# Patient Record
Sex: FEMALE | Race: WHITE | NOT HISPANIC OR LATINO | Employment: PART TIME | URBAN - METROPOLITAN AREA
[De-identification: names, ages, dates, MRNs, and addresses within clinical notes are randomized per-mention and may not be internally consistent; named-entity substitution may affect disease eponyms.]

---

## 2017-06-21 DIAGNOSIS — Z12.31 ENCOUNTER FOR SCREENING MAMMOGRAM FOR MALIGNANT NEOPLASM OF BREAST: ICD-10-CM

## 2017-06-21 DIAGNOSIS — R92.2 INCONCLUSIVE MAMMOGRAM: ICD-10-CM

## 2018-01-09 NOTE — MISCELLANEOUS
Message  Return to work or school evisit:   Genaro Giraldo is under my professional care   She was evaluated by myself via video conference on 9/29/16             Future Appointments    Signatures   Electronically signed by : PATRICK Roberts ; Sep 29 2016  9:53AM EST                       (Author)

## 2018-01-10 NOTE — PROGRESS NOTES
Assessment    1  Bilateral conjunctivitis (372 30) (H10 9)    Plan  Bilateral conjunctivitis    · Ofloxacin 0 3 % Ophthalmic Solution; INSTILL 1 DROP INTO AFFECTED EYE(S) 4  TIMES DAILY    Discussion/Summary    Throw away any eye make up you have used in the past few days  The treatment plan was reviewed with the patient/guardian  The patient/guardian understands and agrees with the treatment plan     Follow Up with your Primary Care Provider in 4-5 days  If your symptoms worsen follow up at the nearest [place] [address] Emergency Room  Chief Complaint    1  Eye Discharge    History of Present Illness  Pt does not wear contacts  Woke with eyes crusted shut and with continuous drainage since  Poonam Hansen presents with complaints of sudden onset of constant episodes of moderate bilateral eye discharge, described as copious  Episodes started about 12 hours ago  Associated symptoms include eye redness, eye itching, eye irritation and eye burning  Review of Systems    Constitutional: No fever, no chills, feels well, no tiredness, no recent weight gain or loss  ENT: as noted in HPI  Cardiovascular: no complaints of slow or fast heart rate, no chest pain, no palpitations, no leg claudication or lower extremity edema  Respiratory: no complaints of shortness of breath, no wheezing, no dyspnea on exertion, no orthopnea or PND  Active Problems    1  Allergic Reaction (995 3)   2  Asthma, mild persistent (493 90) (J45 30)   3  Encounter for screening mammogram for malignant neoplasm of breast (V76 12)   (Z12 31)   4  Lumbago (724 2) (M54 5)   5  Lymphadenopathy (785 6) (R59 1)   6  Screening for cardiovascular condition (V81 2) (Z13 6)   7  Screening for diabetes mellitus (V77 1) (Z13 1)   8  Screening for hyperlipidemia (V77 91) (Z13 220)   9  Well adult on routine health check (V70 0) (Z00 00)    Past Medical History    1  History of Acute bronchitis (466 0) (J20 9)   2   History of Acute pharyngitis (462) (J02 9)   3  History of Acute sinusitis (461 9) (J01 90)   4  History of Asthma (493 90) (J45 909)   5  Asthma with exacerbation (493 92) (J45 901)   6  Candidiasis of vagina (112 1) (B37 3)   7  History of Contusion With Intact Skin Surface Of An Unspecified Part Of The Lower Limb   (924 5)   8  History of Dysfunction of eustachian tube, unspecified laterality (381 81) (H69 80)   9  History of Generalized muscle ache (729 1) (M79 1)   10  History of arthritis (V13 4) (Z87 39)   11  History of Ovarian cyst, right (620 2) (N83 20)   12  History of Right lower quadrant abdominal pain (789 03) (R10 31)   13  History of Streptococcal sore throat (034 0) (J02 0)   14  History of Tinea versicolor (111 0) (B36 0)   15  History of Urinary frequency (788 41) (R35 0)    The active problems and past medical history were reviewed and updated today  Family History  Family History    1  Family history of Diabetes Mellitus (V18 0)   2  Family history of cardiac disorder (V17 49) (Z82 49)   3  Family history of skin conditions (V19 4) (Z84 0)    The family history was reviewed and updated today  Social History    · Alcohol use (V49 89) (Z78 9)   · Coffee   · Exercise frequency (times/week)   ·    · Never a smoker   · Sexually active  The social history was reviewed and updated today  Surgical History    1  History of  Section   2  History of Cholecystectomy   3  History of Hysterectomy    The surgical history was reviewed and updated today  Current Meds   1  Advair Diskus 250-50 MCG/DOSE Inhalation Aerosol Powder Breath Activated; INHALE 1   PUFF TWICE DAILY; Therapy: 06TOO5765 to (Evaluate:2017)  Requested for: 86UVQ0904; Last   Rx:2016 Ordered   2  Albuterol 90 MCG/ACT AERS; INHALE 1 TO 2 PUFFS EVERY 4 TO 6 HOURS AS   NEEDED Recorded   3  Fluconazole 150 MG Oral Tablet; 1 po daily times one day, then repeat in 3 days if   needed;    Therapy: 49AOH5347 to (Last Rx: [id number])  Requested for: 75XJO2717 Ordered   4  Levalbuterol HCl - 0 63 MG/3ML Inhalation Nebulization Solution (Xopenex); USE 1 UNIT   DOSE IN NEBULIZER EVERY 6 TO 8 HOURS AS NEEDED; Therapy: 90QYK4505 to (Last Rx:16Xtc1428)  Requested for: 40CVW4772 Ordered   5  Montelukast Sodium 10 MG Oral Tablet; TAKE 1 TABLET Bedtime; Therapy: 85WMD2450 to (Evaluate:04Jun2017)  Requested for: 47TXP6153; Last   Rx:09Jun2016 Ordered    The medication list was reviewed and updated today  Allergies    1  No Known Drug Allergies    Observations Made  BL eye redness  No other URI symptoms      Message    Rach Mckeon is under my professional care   She was evaluated by myself via video conference on 9/29/16             Future Appointments    Date/Time Provider Specialty Site   12/13/2016 01:15 PM Hans Kearney, [address] [place] [address]     Signatures   Electronically signed by : PATRICK Ozuna ; Sep 29 2016  9:53AM EST                       (Author)

## 2018-06-26 ENCOUNTER — TRANSCRIBE ORDERS (OUTPATIENT)
Dept: ADMINISTRATIVE | Facility: HOSPITAL | Age: 48
End: 2018-06-26

## 2018-06-26 DIAGNOSIS — R10.2 PELVIC PAIN: Primary | ICD-10-CM

## 2018-06-28 ENCOUNTER — HOSPITAL ENCOUNTER (OUTPATIENT)
Dept: RADIOLOGY | Facility: HOSPITAL | Age: 48
Discharge: HOME | End: 2018-06-28
Payer: COMMERCIAL

## 2018-06-28 DIAGNOSIS — R10.2 PELVIC PAIN: ICD-10-CM

## 2018-06-28 PROCEDURE — 76856 US EXAM PELVIC COMPLETE: CPT

## 2018-06-28 PROCEDURE — 76830 TRANSVAGINAL US NON-OB: CPT

## 2018-07-13 ENCOUNTER — OFFICE VISIT (OUTPATIENT)
Dept: OBGYN CLINIC | Facility: CLINIC | Age: 48
End: 2018-07-13
Payer: COMMERCIAL

## 2018-07-13 VITALS — SYSTOLIC BLOOD PRESSURE: 118 MMHG | BODY MASS INDEX: 22.37 KG/M2 | DIASTOLIC BLOOD PRESSURE: 76 MMHG | WEIGHT: 138.6 LBS

## 2018-07-13 DIAGNOSIS — N83.202 CYST OF LEFT OVARY: Primary | ICD-10-CM

## 2018-07-13 DIAGNOSIS — R23.2 HOT FLASHES: ICD-10-CM

## 2018-07-13 DIAGNOSIS — R92.2 DENSE BREAST TISSUE ON MAMMOGRAM: ICD-10-CM

## 2018-07-13 PROBLEM — R92.30 DENSE BREAST TISSUE ON MAMMOGRAM: Status: ACTIVE | Noted: 2017-06-21

## 2018-07-13 PROBLEM — K21.9 GERD (GASTROESOPHAGEAL REFLUX DISEASE): Status: ACTIVE | Noted: 2017-04-04

## 2018-07-13 PROBLEM — I34.1 MVP (MITRAL VALVE PROLAPSE): Status: ACTIVE | Noted: 2017-04-04

## 2018-07-13 PROCEDURE — 99203 OFFICE O/P NEW LOW 30 MIN: CPT | Performed by: OBSTETRICS & GYNECOLOGY

## 2018-07-13 RX ORDER — PANTOPRAZOLE SODIUM 40 MG/1
TABLET, DELAYED RELEASE ORAL
COMMUNITY
Start: 2017-03-02

## 2018-07-13 RX ORDER — CEPHALEXIN 500 MG/1
CAPSULE ORAL
Refills: 0 | COMMUNITY
Start: 2018-07-11 | End: 2018-11-02

## 2018-07-13 NOTE — PROGRESS NOTES
Assessment/Plan: 53year old with resolved pain, and ultrasound showing a left ovarian cyst  1) ovarian cyst- repeat ultrasound in 8 weeks  2) hot flashes- FSH ordered to see if patient is menopausal given history of hysterectomy  3) Dense breast tissue on mammogram- screening mammo ordered  -Return in 8 weeks for annual exam      Subjective Claudean Cheadle is a 53 y o  female here for a problem visit  Patient is here to discuss recent ultrasound results  Sx's started 2 weeks ago  Patient felt extremely bloated and had a lot of pressure  The pain subsided, and she had an ultrasound performed which is listed bellow  She no longer has pressure  She has a history of a supracervical hysterectomy( as per patient) 7 or 8 years ago secondary to adenomyosis  Her last pap smear was a a couple years ago  Denies history of STI's or abnormal pap smears  Has 3 children ( 2 vaginal and one c/s)  She is currently sexually active with her , and has no problems with intercourse  Has a history of of ovarian cyst    There is no problem list on file for this patient  Gynecologic History  No LMP recorded (lmp unknown)  Patient has had a hysterectomy  The following portions of the patient's history were reviewed and updated as appropriate: allergies, current medications, past family history, past medical history, past social history, past surgical history and problem list     Review of Systems  Pertinent items are noted in HPI  Objective    /76   Wt 62 9 kg (138 lb 9 6 oz)   LMP  (LMP Unknown)   Breastfeeding? No   BMI 22 37 kg/m²    GEN: The patient was alert and oriented x3, pleasant well-appearing female in no acute distress     ABD: soft, nT, positive bowel sounds  CHEST: BLCTA, no crackles, no wheezes  CV: RRR, no murmurs

## 2018-11-02 ENCOUNTER — ANNUAL EXAM (OUTPATIENT)
Dept: OBGYN CLINIC | Facility: CLINIC | Age: 48
End: 2018-11-02
Payer: COMMERCIAL

## 2018-11-02 VITALS
BODY MASS INDEX: 22.85 KG/M2 | DIASTOLIC BLOOD PRESSURE: 76 MMHG | WEIGHT: 142.2 LBS | HEIGHT: 66 IN | SYSTOLIC BLOOD PRESSURE: 124 MMHG

## 2018-11-02 DIAGNOSIS — Z01.419 WELL WOMAN EXAM WITH ROUTINE GYNECOLOGICAL EXAM: Primary | ICD-10-CM

## 2018-11-02 DIAGNOSIS — R31.9 HEMATURIA, UNSPECIFIED TYPE: ICD-10-CM

## 2018-11-02 DIAGNOSIS — N95.2 VAGINAL ATROPHY: ICD-10-CM

## 2018-11-02 PROCEDURE — 99396 PREV VISIT EST AGE 40-64: CPT | Performed by: OBSTETRICS & GYNECOLOGY

## 2018-11-02 NOTE — PROGRESS NOTES
ASSESSMENT & PLAN: Lucia Wilson is a 53 y o  [id number] with normal gynecologic exam     1   Routine well woman exam done today  2  Pap and HPV:  The patient's last pap was   Pap and cotesting was done today  Current ASCCP Guidelines reviewed  Repeat in 5 years if normal  3  Mammogram up to date  4  The following were reviewed in today's visit: breast self exam, menopause, osteoporosis, adequate intake of calcium and vitamin D, exercise and healthy diet  5  Menopause- complaining of vaginal dryness and overactive bladder  6   overactive bladder- discussed bladder diary and dietary changes  7  Supracervical hysterectomy for heavy bleeding in  by Dr Milton Brink  8  Guidelines for colonoscopy discussed  Patient will schedule appointment with GI  9  Dyspareunia- Rx for Premarin cream sent to the pharmacy  Patient advised to use it twice a week  Will follow up in 2 months for evaluation  10  Overactive bladder- will keep voiding diarrhea will come to office to the discuss further treatment options necessary  11    Hematuria- hematuria noted on urinalysis performed after exam secondary to overactive bladder  Will repeat urinalysis in 2 weeks    CC:  Annual Gynecologic Examination    HPI: Lucia Wilson is a 53 y o  [id number] who presents for annual gynecologic examination  She has the following concerns:  none    Health Maintenance:    She exercises 2 days per week with cardio  She wears her seatbelt routinely  She does perform regular monthly self breast exams  She feels safe at home  Patients does not follow a particular diet        Her last pap was 3 years ago   Last mammogram: 2018  Last Colonoscopy: never    Past Medical History:   Diagnosis Date    Adenomyosis     Asthma     Murmur        Past Surgical History:   Procedure Laterality Date    CHOLECYSTECTOMY      HYSTERECTOMY         Past OB/Gyn History:  OB History      Para Term  AB Living    3 3 3     3    SAB TAB Ectopic Multiple Live Births            3           No LMP recorded (lmp unknown)  Patient has had a hysterectomy  History of sexually transmitted infection No  History of abnormal pap smears  No     Patient is currently sexually active  heterosexual Birth control: none  Family History   Problem Relation Age of Onset    Diabetes Father        Social History:  Social History     Social History    Marital status: /Civil Union     Spouse name: N/A    Number of children: N/A    Years of education: N/A     Occupational History    Not on file  Social History Main Topics    Smoking status: Never Smoker    Smokeless tobacco: Never Used    Alcohol use No    Drug use: No    Sexual activity: Yes     Birth control/ protection: None     Other Topics Concern    Not on file     Social History Narrative    No narrative on file     Presently lives with   Patient is   Patient is currently employed chiropractic assistant    Allergies   Allergen Reactions    Other Anaphylaxis     Pecan and Soy Nuts    Pecan Nut (Diagnostic)     Pseudoephedrine Other (See Comments)     Felt out of it  Felt out of it    Soy Lecithin [Lecithin]        Current Outpatient Prescriptions:     Albuterol Sulfate 108 (90 Base) MCG/ACT AEPB, Inhale 2 puffs every 6 (six) hours, Disp: , Rfl:     montelukast (SINGULAIR) 10 mg tablet, Take 10 mg by mouth daily at bedtime, Disp: , Rfl:     pantoprazole (PROTONIX) 40 mg tablet, take 1 tablet by mouth once daily, Disp: , Rfl:     Review of Systems:  A complete review of systems was performed and was negative, except as listed  none    Physical Exam:  /76   Ht 5' 6" (1 676 m)   Wt 64 5 kg (142 lb 3 2 oz)   LMP  (LMP Unknown)   BMI 22 95 kg/m²    GEN: The patient was alert and oriented x3, pleasant well-appearing female in no acute distress     HEENT:  Unremarkable, no anterior or posterior lymphadenopathy, no thyromegaly  CV:  RRR, no murmurs  RESP:  Clear to auscultation bilaterally  BREAST:  Symmetric breasts with no palpable breast masses or obvious breast lesions  She has no retractions or nipple discharge  She has no axillary abnormalities or palpable masses  Self breast exam is taught  ABD:  Soft, nontender, nondistended, normoactive bowel sounds,   EXT:  WWP, nontender, no edema  BACK:  No CVA tenderness, no tenderness to palpation along spine  PELVIC:  Normal appearing external female genitalia, normal vaginal epithelium, No discharge  Cervix present   Bimanual: absent CMT,  absent uterus, non-tender  No palpable adnexal masses

## 2018-11-06 LAB
CYTOLOGIST CVX/VAG CYTO: NORMAL
DX ICD CODE: NORMAL
HPV I/H RISK 1 DNA CVX QL PROBE+SIG AMP: NEGATIVE
OTHER STN SPEC: NORMAL
PATH REPORT.FINAL DX SPEC: NORMAL
SL AMB NOTE:: NORMAL
SL AMB SPECIMEN ADEQUACY: NORMAL
SL AMB TEST METHODOLOGY: NORMAL

## 2020-02-10 ENCOUNTER — TELEPHONE (OUTPATIENT)
Dept: OBGYN CLINIC | Facility: CLINIC | Age: 50
End: 2020-02-10

## 2020-02-10 DIAGNOSIS — N95.2 VAGINAL ATROPHY: ICD-10-CM

## 2020-02-10 NOTE — TELEPHONE ENCOUNTER
Dr Alberto Rasmussen pt, scheduled an appt with you on 3/18  Needs a fill of premarin to get to appt   Reviewed pharmacy, please send to [facility]

## 2021-02-18 DIAGNOSIS — Z23 ENCOUNTER FOR IMMUNIZATION: ICD-10-CM

## 2022-03-01 NOTE — PROGRESS NOTES
Assessment and Plan:   Ms Gloria Hunter is a 60-year-old female with no significant past medical history who presents for evaluation of a positive MAYTE 1:80 homogeneous pattern and abnormal hand x-rays suggestive of an inflammatory arthritis  She is referred by Dr Quique Geller for a rheumatology consult  Alfred Cobb presents today for evaluation of a positive MAYTE that was detected due to progressively worsening joint pains she has been experiencing over the past few months, especially affecting her hands and wrists  Given the symmetric small joint involvement along with concerns for an inflammatory arthropathy/periarticular osteopenia on the x-rays as well as significant response noted to a trial of steroids, her presentation does concern me for an inflammatory arthritis possibly rheumatoid arthritis  To further evaluate her symptoms I would like to obtain rheumatoid serologies as well as schedule her for an ultrasound of her hands and wrists in the next few weeks to assess for subtle inflammation as her joint examination is unrevealing today (likely a result of her recent steroid use)  In the meanwhile if the pain recurs I requested she try to manage with over-the-counter Tylenol and NSAIDs as needed and I would prefer if she can refrain from a course of steroids until the ultrasound is done  - In regards to the positive MAYTE she does not report extra-articular symptoms that would be concerning for an underlying connective tissue disease  Once the workup is complete I will determine if the MAYTE may be clinically significant in the context of the inflammatory arthritis that is being evaluated  Plan:  Diagnoses and all orders for this visit:    Positive MAYTE (antinuclear antibody)  -     Beta-2 glycoprotein antibodies; Future  -     C3 complement; Future  -     C4 complement; Future  -     Cardiolipin antibody; Future  -     Lupus anticoagulant; Future  -     Cyclic citrul peptide antibody, IgG;  Future  -     HLA-B27 antigen; Future  -     Protein / creatinine ratio, urine  -     Urinalysis with microscopic  -     MAYTE Comprehensive Panel; Future  -     Rheumatoid Arthritis Factor; Future  -     Beta-2 glycoprotein antibodies  -     C3 complement  -     C4 complement  -     Cardiolipin antibody  -     Lupus anticoagulant  -     Cyclic citrul peptide antibody, IgG  -     HLA-B27 antigen  -     MAYTE Comprehensive Panel  -     Rheumatoid Arthritis Factor    Diffuse arthralgia  -     Cyclic citrul peptide antibody, IgG; Future  -     HLA-B27 antigen; Future  -     Rheumatoid Arthritis Factor; Future  -     Cyclic citrul peptide antibody, IgG  -     HLA-B27 antigen  -     Rheumatoid Arthritis Factor    Bilateral hand pain  -     US MSK limited; Future    History of recent steroid use    Need for hepatitis C screening test  -     Hepatitis B surface antigen; Future  -     Hepatitis B core antibody, total; Future  -     Hepatitis C antibody; Future  -     Hepatitis B surface antigen  -     Hepatitis B core antibody, total  -     Hepatitis C antibody    Vitamin D deficiency  -     Vitamin D 25 hydroxy; Future  -     Vitamin D 25 hydroxy    Other orders  -     ciprofloxacin (CILOXAN) 0 3 % ophthalmic solution; instill 1 drop into both eyes four times a day  -     cycloSPORINE (Restasis) 0 05 % ophthalmic emulsion; instill 1 drop into both eyes twice a day  -     Diclofenac Sodium (VOLTAREN) 1 %; Apply 1 application topically 4 (four) times a day  -     erythromycin (ILOTYCIN) ophthalmic ointment; apply into both eyes at bedtime as directed  -     magnesium oxide (MAG-OX) 400 mg tablet; Take 1 tablet by mouth daily  -     ondansetron (ZOFRAN) 4 mg tablet; take 1 tablet by mouth once daily if needed for nausea and vomiting  -     Discontinue: predniSONE 20 mg tablet;  Take 40 mg by mouth daily (Patient not taking: Reported on 3/2/2022 )  -     tobramycin-dexamethasone (TOBRADEX) ophthalmic suspension; instill 1 drop into both eyes four times a day for 7 days      Activities as tolerated  Exercise: try to maintain a low impact exercise regimen as much as possible  Walk for 30 minutes a day for at least 3 days a week  Continue other medications as prescribed by PCP and other specialists  RTC in 6 weeks  HPI  Ms Raysa Rosa is a 67-year-old female with no significant past medical history who presents for evaluation of a positive MAYTE 1:80 homogeneous pattern and abnormal hand x-rays suggestive of an inflammatory arthritis  She is referred by Dr Lynsey Paul for a rheumatology consult  Patient reports around August 2021 she started to experience bilateral hand and wrist pain which has been gradually progressive since then  Her symptoms have been occurring on a daily basis and have been fairly constant  She has had difficulty with hand related activities as well as with her  strength  She has noticed occasional pain also occur in her elbows, knees and ankles  No significant joint pains in her shoulders, low back, hips or feet  At times she has noticed her fingers to be puffy appearing  She does experience morning stiffness affecting her hands that can take up to an hour to improve  She has tried over-the-counter NSAIDs without any improvement in her symptoms  In view of these complaints she was seen by her primary care physician and had hand x-rays done which showed findings of periarticular osteopenia concerning for an inflammatory arthritis  Blood work showed the positive MAYTE  A CBC, CMP, ESR, CRP and TSH were unremarkable  She denies fevers, unintentional weight loss, focal alopecia, inflammatory eye disease (does report dry eyes for which she is on Restasis), dry mouth, skin rash, psoriasis, photosensitivity, mouth/ulcers, swollen glands, pleuritic chest pain, inflammatory bowel disease, blood clots, miscarriages, Raynaud's or a family history of autoimmune disease      In view of her ongoing joint pains she was prescribed a prednisone course of 40 mg once daily for 5 days that she completed less than a week ago  She states while on the prednisone this helped significantly to resolve all her symptoms  Since completing the prednisone course she has felt the hand pain to come back to some degree but does not compare to the prominent pain she was experiencing previously  The following portions of the patient's history were reviewed and updated as appropriate: allergies, current medications, past family history, past medical history, past social history, past surgical history and problem list       Review of Systems  Constitutional: Negative for weight change, fevers, chills, night sweats, fatigue  ENT/Mouth: Negative for hearing changes, ear pain, nasal congestion, sinus pain, hoarseness, sore throat, rhinorrhea, swallowing difficulty  Eyes: Negative for pain, redness, discharge, vision changes  Cardiovascular: Negative for chest pain, SOB, palpitations  Respiratory: Negative for cough, sputum, wheezing, dyspnea  Gastrointestinal: Negative for nausea, vomiting, diarrhea, constipation, pain, heartburn  Genitourinary: Negative for dysuria, urinary frequency, hematuria  Musculoskeletal: As per HPI  Skin: Negative for skin rash, color changes  Neuro: Negative for weakness, numbness, tingling, loss of consciousness  Psych: Negative for anxiety, depression  Heme/Lymph: Negative for easy bruising, bleeding, lymphadenopathy          Past Medical History:   Diagnosis Date    Adenomyosis     Arthritis     Asthma     Asthma with exacerbation     Resolved 2015     Murmur     Ovarian cyst, right     Resolved 2016     Tinea versicolor     Resolved 2014     Urinary frequency     Resolved 2014        Past Surgical History:   Procedure Laterality Date     SECTION      CHOLECYSTECTOMY  1998    HYSTERECTOMY         Social History     Socioeconomic History    Marital status: /Civil Union     Spouse name: Not on file    Number of children: Not on file    Years of education: Not on file    Highest education level: Not on file   Occupational History    Not on file   Tobacco Use    Smoking status: Never Smoker    Smokeless tobacco: Never Used   Substance and Sexual Activity    Alcohol use: Yes     Comment: socially    Drug use: No    Sexual activity: Yes     Birth control/protection: None   Other Topics Concern    Not on file   Social History Narrative    Coffee    Exercise frequency (times/week)     Social Determinants of Health     Financial Resource Strain: Not on file   Food Insecurity: Not on file   Transportation Needs: Not on file   Physical Activity: Not on file   Stress: Not on file   Social Connections: Not on file   Intimate Partner Violence: Not on file   Housing Stability: Not on file       Family History   Problem Relation Age of Onset    Diabetes Father     Diabetes Family     Heart disease Family     Other Family         Skin conditions        Allergies   Allergen Reactions    Other Anaphylaxis     Pecan and Soy Nuts    Pecan Nut (Diagnostic) - Food Allergy     Pseudoephedrine Other (See Comments)     Felt out of it  Felt out of it    Soy Lecithin [Lecithin]        Current Outpatient Medications:     cycloSPORINE (Restasis) 0 05 % ophthalmic emulsion, instill 1 drop into both eyes twice a day, Disp: , Rfl:     Diclofenac Sodium (VOLTAREN) 1 %, Apply 1 application topically 4 (four) times a day, Disp: , Rfl:     montelukast (SINGULAIR) 10 mg tablet, Take 10 mg by mouth daily at bedtime, Disp: , Rfl:     Albuterol Sulfate 108 (90 Base) MCG/ACT AEPB, Inhale 2 puffs every 6 (six) hours, Disp: , Rfl:     ciprofloxacin (CILOXAN) 0 3 % ophthalmic solution, instill 1 drop into both eyes four times a day, Disp: , Rfl:     conjugated estrogens (PREMARIN) vaginal cream, Insert 0 5 g into the vagina 2 (two) times a week, Disp: 42 5 g, Rfl: 1    erythromycin (ILOTYCIN) ophthalmic ointment, apply into both eyes at bedtime as directed, Disp: , Rfl:     magnesium oxide (MAG-OX) 400 mg tablet, Take 1 tablet by mouth daily, Disp: , Rfl:     ondansetron (ZOFRAN) 4 mg tablet, take 1 tablet by mouth once daily if needed for nausea and vomiting, Disp: , Rfl:     pantoprazole (PROTONIX) 40 mg tablet, take 1 tablet by mouth once daily, Disp: , Rfl:     tobramycin-dexamethasone (TOBRADEX) ophthalmic suspension, instill 1 drop into both eyes four times a day for 7 days, Disp: , Rfl:       Objective:    Vitals:    03/02/22 1139   BP: 108/78   Pulse: 80   Weight: 65 3 kg (144 lb)       Physical Exam  General: Well appearing, well nourished, in no distress  Oriented x 3, normal mood and affect  Ambulating without difficulty  Skin: Good turgor, no rash, unusual bruising or prominent lesions  Hair: Normal texture and distribution  Nails: Normal color, no deformities  HEENT:  Head: Normocephalic, atraumatic  Eyes: Conjunctiva clear, sclera non-icteric, EOM intact  Extremities: No amputations or deformities, cyanosis, edema  Musculoskeletal:   Hands - the right hand joints are unremarkable  At the left hand she is tender to palpation of the 2nd through 5th PIP joints without soft tissue swelling  The remaining peripheral joints are without soft tissue swelling or tenderness  Neurologic: Alert and oriented  No focal neurological deficits appreciated  Psychiatric: Normal mood and affect  DEANDRE Celaya    Rheumatology

## 2022-03-02 ENCOUNTER — OFFICE VISIT (OUTPATIENT)
Dept: RHEUMATOLOGY | Facility: CLINIC | Age: 52
End: 2022-03-02
Payer: COMMERCIAL

## 2022-03-02 VITALS
WEIGHT: 144 LBS | HEART RATE: 80 BPM | DIASTOLIC BLOOD PRESSURE: 78 MMHG | SYSTOLIC BLOOD PRESSURE: 108 MMHG | BODY MASS INDEX: 23.24 KG/M2

## 2022-03-02 DIAGNOSIS — M79.642 BILATERAL HAND PAIN: ICD-10-CM

## 2022-03-02 DIAGNOSIS — M79.641 BILATERAL HAND PAIN: ICD-10-CM

## 2022-03-02 DIAGNOSIS — Z11.59 NEED FOR HEPATITIS C SCREENING TEST: ICD-10-CM

## 2022-03-02 DIAGNOSIS — E55.9 VITAMIN D DEFICIENCY: ICD-10-CM

## 2022-03-02 DIAGNOSIS — M25.50 DIFFUSE ARTHRALGIA: ICD-10-CM

## 2022-03-02 DIAGNOSIS — Z92.241 HISTORY OF RECENT STEROID USE: ICD-10-CM

## 2022-03-02 DIAGNOSIS — R76.8 POSITIVE ANA (ANTINUCLEAR ANTIBODY): Primary | ICD-10-CM

## 2022-03-02 PROCEDURE — 99205 OFFICE O/P NEW HI 60 MIN: CPT | Performed by: INTERNAL MEDICINE

## 2022-03-02 RX ORDER — CYCLOSPORINE 0.5 MG/ML
EMULSION OPHTHALMIC
COMMUNITY
Start: 2021-11-23

## 2022-03-02 RX ORDER — ERYTHROMYCIN 5 MG/G
OINTMENT OPHTHALMIC
COMMUNITY
Start: 2021-12-09 | End: 2022-04-13

## 2022-03-02 RX ORDER — CIPROFLOXACIN HYDROCHLORIDE 3.5 MG/ML
SOLUTION/ DROPS TOPICAL
COMMUNITY
Start: 2021-12-09 | End: 2022-04-13

## 2022-03-02 RX ORDER — PREDNISONE 20 MG/1
40 TABLET ORAL DAILY
COMMUNITY
Start: 2022-02-15 | End: 2022-03-02

## 2022-03-02 RX ORDER — ONDANSETRON 4 MG/1
TABLET, FILM COATED ORAL
COMMUNITY
Start: 2021-11-26 | End: 2022-04-13

## 2022-03-02 RX ORDER — TOBRAMYCIN AND DEXAMETHASONE 3; 1 MG/ML; MG/ML
SUSPENSION/ DROPS OPHTHALMIC
COMMUNITY
Start: 2021-11-23 | End: 2022-04-13

## 2022-03-02 RX ORDER — MAGNESIUM OXIDE 400 MG/1
1 TABLET ORAL DAILY
COMMUNITY
Start: 2021-11-26 | End: 2022-04-13

## 2022-03-29 ENCOUNTER — HOSPITAL ENCOUNTER (OUTPATIENT)
Dept: RADIOLOGY | Facility: HOSPITAL | Age: 52
Discharge: HOME | End: 2022-03-29
Attending: INTERNAL MEDICINE
Payer: COMMERCIAL

## 2022-03-29 DIAGNOSIS — M79.642 BILATERAL HAND PAIN: ICD-10-CM

## 2022-03-29 DIAGNOSIS — M79.641 BILATERAL HAND PAIN: ICD-10-CM

## 2022-03-29 PROCEDURE — 76882 US LMTD JT/FCL EVL NVASC XTR: CPT

## 2022-04-04 ENCOUNTER — TELEPHONE (OUTPATIENT)
Dept: OBGYN CLINIC | Facility: HOSPITAL | Age: 52
End: 2022-04-04

## 2022-04-04 NOTE — TELEPHONE ENCOUNTER
Patient called to advise she has an urinalysis  She believes she has a UTI and would like to know if this is something you would treat or would she contact her PCP  Patient can be reached at [phone]

## 2022-04-04 NOTE — TELEPHONE ENCOUNTER
Spoke with patient and she will contact PCP  She had blood work done at [facility] this week and she is wondering if she can have a test added on for [place]? ?  [facility] number is [phone]

## 2022-04-12 NOTE — PROGRESS NOTES
Assessment and Plan:   Ms Maame Smith is a 52-year-old female with no significant past medical history who presents for follow-up of a positive MAYTE 1:80 homogeneous pattern and abnormal hand x-rays suggestive of an inflammatory arthritis       - Jen Nyhan presents today for follow-up of a positive MAYTE that was detected due to progressively worsening joint pains she has been experiencing over the past few months, especially affecting her hands and wrists  Given the symmetric small joint involvement along with concerns for an inflammatory arthropathy/periarticular osteopenia on the x-rays as well as significant response noted to a trial of steroids, her presentation does concern me for an inflammatory arthritis possibly rheumatoid arthritis, but it is interesting to note that the ultrasound of her hands was completely normal without any signs of an inflammatory condition and there has been no synovitis noted on her physical examination  I question if her presentation is early and that is why no objective findings have been seen  I would like to review the hand x-rays that she had at [facility] and she will drop off the images to the office  If I agree with periarticular osteopenia then I will proceed with management of an inflammatory arthritis/undifferentiated connective tissue disease and start her on hydroxychloroquine  If the x-ray findings are questionable then as an initial option I may trial her on different NSAIDs first     - Otherwise she does not report extra-articular symptoms that would be concerning for an underlying connective tissue disorder in the context of the positive MAYTE and it is reassuring to note that her specific serologies were normal   I will continue to monitor for this possibility     - An EMG/NCS study will also be obtained to evaluate the hand pain, numbness and tingling she has been experiencing        Plan:  Diagnoses and all orders for this visit:    Positive MAYTE (antinuclear antibody)    Diffuse arthralgia    Bilateral hand pain  -     meloxicam (Mobic) 15 mg tablet; Take 1 tablet (15 mg total) by mouth daily as needed for moderate pain    Numbness and tingling in both hands  -     EMG 2 Limb Upper Extremity; Future    Tick bite, unspecified site, sequela  -     Lyme Total Antibody Profile with reflex to WB; Future  -     Lyme Total Antibody Profile with reflex to WB    Other orders  -     doxycycline hyclate (VIBRAMYCIN) 100 mg capsule; Take 100 mg by mouth every 12 (twelve) hours      Activities as tolerated  Exercise: try to maintain a low impact exercise regimen as much as possible  Walk for 30 minutes a day for at least 3 days a week  Continue other medications as prescribed by PCP and other specialists  RTC in 2 months  HPI    INITIAL VISIT NOTE (3/2022):  Ms Israel Bhatia is a 69-year-old female with no significant past medical history who presents for evaluation of a positive MAYTE 1:80 homogeneous pattern and abnormal hand x-rays suggestive of an inflammatory arthritis  She is referred by Dr Trista Henao for a rheumatology consult        Patient reports around August 2021 she started to experience bilateral hand and wrist pain which has been gradually progressive since then  Her symptoms have been occurring on a daily basis and have been fairly constant  She has had difficulty with hand related activities as well as with her  strength  She has noticed occasional pain also occur in her elbows, knees and ankles  No significant joint pains in her shoulders, low back, hips or feet  At times she has noticed her fingers to be puffy appearing  She does experience morning stiffness affecting her hands that can take up to an hour to improve  She has tried over-the-counter NSAIDs without any improvement in her symptoms    In view of these complaints she was seen by her primary care physician and had hand x-rays done which showed findings of periarticular osteopenia concerning for an inflammatory arthritis  Blood work showed the positive MAYTE  A CBC, CMP, ESR, CRP and TSH were unremarkable      She denies fevers, unintentional weight loss, focal alopecia, inflammatory eye disease (does report dry eyes for which she is on Restasis), dry mouth, skin rash, psoriasis, photosensitivity, mouth/ulcers, swollen glands, pleuritic chest pain, inflammatory bowel disease, blood clots, miscarriages, Raynaud's or a family history of autoimmune disease      In view of her ongoing joint pains she was prescribed a prednisone course of 40 mg once daily for 5 days that she completed less than a week ago  She states while on the prednisone this helped significantly to resolve all her symptoms  Since completing the prednisone course she has felt the hand pain to come back to some degree but does not compare to the prominent pain she was experiencing previously        4/13/2022:  Patient presents for a follow-up today  I reviewed her labs which showed an unremarkable MAYTE specificity, C3, C4, antiphospholipid antibody testing, urine protein creatinine ratio, rheumatoid factor, anti CCP antibody and HLA B27 antigen  An ultrasound of her hands and wrists was normal     She reports since the last office visit and completing the steroids she has noticed more pain and stiffness to recur in her hands  Her hands are the most problematic but she does experience fleeting joint pains in other areas  She is not taking any over-the-counter pain medications  She denies any joint swelling but does feel like her hands are full especially in the morning  She does experience morning stiffness affecting her hands that can take an hour to improve  No new complaints otherwise      The following portions of the patient's history were reviewed and updated as appropriate: allergies, current medications, past family history, past medical history, past social history, past surgical history and problem list       Review of Systems  Constitutional: Negative for weight change, fevers, chills, night sweats, fatigue  ENT/Mouth: Negative for hearing changes, ear pain, nasal congestion, sinus pain, hoarseness, sore throat, rhinorrhea, swallowing difficulty  Eyes: Negative for pain, redness, discharge, vision changes  Cardiovascular: Negative for chest pain, SOB, palpitations  Respiratory: Negative for cough, sputum, wheezing, dyspnea  Gastrointestinal: Negative for nausea, vomiting, diarrhea, constipation, pain, heartburn  Genitourinary: Negative for dysuria, urinary frequency, hematuria  Musculoskeletal: As per HPI  Skin: Negative for skin rash, color changes  Neuro: Negative for weakness, numbness, tingling, loss of consciousness  Psych: Negative for anxiety, depression  Heme/Lymph: Negative for easy bruising, bleeding, lymphadenopathy          Past Medical History:   Diagnosis Date    Adenomyosis     Arthritis     Asthma     Asthma with exacerbation     Resolved 2015     Murmur     Ovarian cyst, right     Resolved 2016     Tinea versicolor     Resolved 2014     Urinary frequency     Resolved 2014        Past Surgical History:   Procedure Laterality Date     SECTION      CHOLECYSTECTOMY  1998    HYSTERECTOMY         Social History     Socioeconomic History    Marital status: /Civil Union     Spouse name: Not on file    Number of children: Not on file    Years of education: Not on file    Highest education level: Not on file   Occupational History    Not on file   Tobacco Use    Smoking status: Never Smoker    Smokeless tobacco: Never Used   Substance and Sexual Activity    Alcohol use: Yes     Comment: socially    Drug use: No    Sexual activity: Yes     Birth control/protection: None   Other Topics Concern    Not on file   Social History Narrative    Coffee    Exercise frequency (times/week)     Social Determinants of Health Financial Resource Strain: Not on file   Food Insecurity: Not on file   Transportation Needs: Not on file   Physical Activity: Not on file   Stress: Not on file   Social Connections: Not on file   Intimate Partner Violence: Not on file   Housing Stability: Not on file       Family History   Problem Relation Age of Onset    Diabetes Father     Diabetes Family     Heart disease Family     Other Family         Skin conditions        Allergies   Allergen Reactions    Other Anaphylaxis     Pecan and Soy Nuts    Pecan Nut (Diagnostic) - Food Allergy     Pseudoephedrine Other (See Comments)     Felt out of it  Felt out of it    Soy Lecithin [Lecithin]        Current Outpatient Medications:     Albuterol Sulfate 108 (90 Base) MCG/ACT AEPB, Inhale 2 puffs every 6 (six) hours, Disp: , Rfl:     conjugated estrogens (PREMARIN) vaginal cream, Insert 0 5 g into the vagina 2 (two) times a week, Disp: 42 5 g, Rfl: 1    cycloSPORINE (Restasis) 0 05 % ophthalmic emulsion, instill 1 drop into both eyes twice a day, Disp: , Rfl:     Diclofenac Sodium (VOLTAREN) 1 %, Apply 1 application topically 4 (four) times a day, Disp: , Rfl:     doxycycline hyclate (VIBRAMYCIN) 100 mg capsule, Take 100 mg by mouth every 12 (twelve) hours, Disp: , Rfl:     meloxicam (Mobic) 15 mg tablet, Take 1 tablet (15 mg total) by mouth daily as needed for moderate pain, Disp: 30 tablet, Rfl: 0    montelukast (SINGULAIR) 10 mg tablet, Take 10 mg by mouth daily at bedtime, Disp: , Rfl:     pantoprazole (PROTONIX) 40 mg tablet, take 1 tablet by mouth once daily, Disp: , Rfl:       Objective:    Vitals:    04/13/22 1251   BP: 108/78   Pulse: 78   Weight: 65 3 kg (144 lb)       Physical Exam  General: Well appearing, well nourished, in no distress  Oriented x 3, normal mood and affect  Ambulating without difficulty  Skin: Good turgor, no rash, unusual bruising or prominent lesions  Hair: Normal texture and distribution    Nails: Normal color, no deformities  HEENT:  Head: Normocephalic, atraumatic  Eyes: Conjunctiva clear, sclera non-icteric, EOM intact  Extremities: No amputations or deformities, cyanosis, edema  Musculoskeletal:   Hands - there is tenderness noted at multiple PIP and MCP joints but there is no soft tissue swelling noted  Wrists - unremarkable  Neurologic: Alert and oriented  No focal neurological deficits appreciated  Psychiatric: Normal mood and affect  DEANDRE Carr    Rheumatology

## 2022-04-13 ENCOUNTER — OFFICE VISIT (OUTPATIENT)
Dept: RHEUMATOLOGY | Facility: CLINIC | Age: 52
End: 2022-04-13
Payer: COMMERCIAL

## 2022-04-13 VITALS
HEART RATE: 78 BPM | BODY MASS INDEX: 23.24 KG/M2 | DIASTOLIC BLOOD PRESSURE: 78 MMHG | SYSTOLIC BLOOD PRESSURE: 108 MMHG | WEIGHT: 144 LBS

## 2022-04-13 DIAGNOSIS — M79.642 BILATERAL HAND PAIN: ICD-10-CM

## 2022-04-13 DIAGNOSIS — M25.50 DIFFUSE ARTHRALGIA: ICD-10-CM

## 2022-04-13 DIAGNOSIS — R76.8 POSITIVE ANA (ANTINUCLEAR ANTIBODY): Primary | ICD-10-CM

## 2022-04-13 DIAGNOSIS — R20.2 NUMBNESS AND TINGLING IN BOTH HANDS: ICD-10-CM

## 2022-04-13 DIAGNOSIS — R20.0 NUMBNESS AND TINGLING IN BOTH HANDS: ICD-10-CM

## 2022-04-13 DIAGNOSIS — M79.641 BILATERAL HAND PAIN: ICD-10-CM
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
## 2022-04-13 PROCEDURE — 99215 OFFICE O/P EST HI 40 MIN: CPT | Performed by: INTERNAL MEDICINE

## 2022-04-13 RX ORDER — DOXYCYCLINE HYCLATE 100 MG/1
100 CAPSULE ORAL EVERY 12 HOURS
COMMUNITY
Start: 2022-03-29

## 2022-04-13 RX ORDER — MELOXICAM 15 MG/1
15 TABLET ORAL DAILY PRN
Qty: 30 TABLET | Refills: 0 | Status: SHIPPED | OUTPATIENT
Start: 2022-04-13

## 2022-04-21 ENCOUNTER — TELEPHONE (OUTPATIENT)
Dept: OBGYN CLINIC | Facility: OTHER | Age: 52
End: 2022-04-21

## 2022-04-21 NOTE — TELEPHONE ENCOUNTER
I reviewed them, no findings to suggest RA - will continue with plan as discussed at office visit and see her back in June, thanks

## 2022-04-21 NOTE — TELEPHONE ENCOUNTER
Patient called in , she said she had dropped of Xrays last Friday @ [facility]  She wanted to know  If you could review them and giver her a call?     C/b # [phone]

## 2022-04-22 ENCOUNTER — TELEPHONE (OUTPATIENT)
Dept: OBGYN CLINIC | Facility: HOSPITAL | Age: 52
End: 2022-04-22

## 2022-04-22 NOTE — TELEPHONE ENCOUNTER
DR Sukhi Hinton  RE: blood work DX  # [phone]       [facility] lab called asking what the diagnosis should be for assigned for recent lab work    Per [facility] lab, "Tick bite" is not being accepted by insurance

## 2022-05-05 ENCOUNTER — OFFICE VISIT (OUTPATIENT)
Dept: OBGYN CLINIC | Facility: CLINIC | Age: 52
End: 2022-05-05
Payer: COMMERCIAL

## 2022-05-05 VITALS — HEART RATE: 70 BPM | WEIGHT: 146.4 LBS | BODY MASS INDEX: 23.53 KG/M2 | OXYGEN SATURATION: 98 % | HEIGHT: 66 IN

## 2022-05-05 DIAGNOSIS — R20.0 BILATERAL HAND NUMBNESS: Primary | ICD-10-CM

## 2022-05-05 PROCEDURE — 99203 OFFICE O/P NEW LOW 30 MIN: CPT | Performed by: PHYSICIAN ASSISTANT

## 2022-05-05 NOTE — PROGRESS NOTES
ASSESSMENT/PLAN:    Assessment:   Hand numbness b/l    Plan:   Recommend getting the EMG as ordered by Dr Mina Soto  We will see if we can move the appt date up    Follow Up: If the EMG indicates + CTS, then f/u with Dr Chris Beyer    To Do Next Visit:       General Discussions:       Operative Discussions:          _____________________________________________________  CHIEF COMPLAINT:  Chief Complaint   Patient presents with    Right Wrist - Carpal Tunnel    Left Wrist - Carpal Tunnel         SUBJECTIVE:  Akilah Andrews is a 49 y o  female who presents with Numbness and tingling to the bilateral hand  This started  1 year(s) ago as Insidious  She sees Dr Mina Soto who suspected CTS and ordered an EMG  She has not had this done yet, she states because they were scheduling into November    Radiation: Yes to the  index finger, long finger and thumb  (occasionally ring and small, but usually not)  Previous Treatments: bracing without relief  Associated symptoms: weakness and dropping things  Handedness: right      PAST MEDICAL HISTORY:  Past Medical History:   Diagnosis Date    Adenomyosis     Arthritis     Asthma     Asthma with exacerbation     Resolved 2015     Murmur     Ovarian cyst, right     Resolved 2016     Tinea versicolor     Resolved 2014     Urinary frequency     Resolved 2014        PAST SURGICAL HISTORY:  Past Surgical History:   Procedure Laterality Date     SECTION      CHOLECYSTECTOMY  1998    HYSTERECTOMY         FAMILY HISTORY:  Family History   Problem Relation Age of Onset    Diabetes Father     Diabetes Family     Heart disease Family     Other Family         Skin conditions        SOCIAL HISTORY:  Social History     Tobacco Use    Smoking status: Never Smoker    Smokeless tobacco: Never Used   Substance Use Topics    Alcohol use: Yes     Comment: socially    Drug use: No       MEDICATIONS:    Current Outpatient Medications:     Albuterol Sulfate 108 (90 Base) MCG/ACT AEPB, Inhale 2 puffs every 6 (six) hours, Disp: , Rfl:     conjugated estrogens (PREMARIN) vaginal cream, Insert 0 5 g into the vagina 2 (two) times a week, Disp: 42 5 g, Rfl: 1    cycloSPORINE (Restasis) 0 05 % ophthalmic emulsion, instill 1 drop into both eyes twice a day, Disp: , Rfl:     Diclofenac Sodium (VOLTAREN) 1 %, Apply 1 application topically 4 (four) times a day, Disp: , Rfl:     doxycycline hyclate (VIBRAMYCIN) 100 mg capsule, Take 100 mg by mouth every 12 (twelve) hours, Disp: , Rfl:     meloxicam (Mobic) 15 mg tablet, Take 1 tablet (15 mg total) by mouth daily as needed for moderate pain, Disp: 30 tablet, Rfl: 0    montelukast (SINGULAIR) 10 mg tablet, Take 10 mg by mouth daily at bedtime, Disp: , Rfl:     pantoprazole (PROTONIX) 40 mg tablet, take 1 tablet by mouth once daily, Disp: , Rfl:     ALLERGIES:  Allergies   Allergen Reactions    Other Anaphylaxis     Pecan and Soy Nuts    Pecan Nut (Diagnostic) - Food Allergy     Pseudoephedrine Other (See Comments)     Felt out of it  Felt out of it    Soy Lecithin [Lecithin]        REVIEW OF SYSTEMS:  Pertinent items are noted in HPI  A comprehensive review of systems was negative      LABS:  HgA1c: No results found for: HGBA1C  BMP:   Lab Results   Component Value Date    GLUCOSE 87 08/25/2015    CALCIUM 8 8 08/25/2015     08/25/2015    K 3 6 08/25/2015    CO2 27 7 08/25/2015     08/25/2015    BUN 5 08/25/2015    CREATININE 0 78 08/25/2015         _____________________________________________________  PHYSICAL EXAMINATION:  Vital signs: Pulse 70   Ht 5' 6" (1 676 m)   Wt 66 4 kg (146 lb 6 4 oz)   LMP  (LMP Unknown)   SpO2 98%   BMI 23 63 kg/m²   General: well developed and well nourished, alert, oriented times 3 and appears comfortable  Psychiatric: Normal  HEENT: Trachea Midline, No torticollis  Cardiovascular: No discernable arrhythmia  Pulmonary: No wheezing or stridor  Abdomen: No guarding  Extremities: No peripheral edema  Skin: No masses, erythema, lacerations, fluctation, ulcerations  Neurovascular: Sensation Intact to the Median, Ulnar, Radial Nerve, Motor Intact to the Median, Ulnar, Radial Nerve and Pulses Intact    MUSCULOSKELETAL EXAMINATION:  LEFT SIDE:  Carpal tunnel:  No tinel's, phalans, weakness, atrophy  RIGHT SIDE:  Carpal tunnel:  No tinel's, phalans, weakness, atrophy    _____________________________________________________  STUDIES REVIEWED:  No Studies to review      PROCEDURES PERFORMED:  Procedures  No Procedures performed today

## 2022-05-24 ENCOUNTER — HOSPITAL ENCOUNTER (OUTPATIENT)
Dept: NEUROLOGY | Facility: CLINIC | Age: 52
Discharge: HOME | End: 2022-05-24
Attending: INTERNAL MEDICINE
Payer: COMMERCIAL

## 2022-05-24 DIAGNOSIS — R20.0 NUMBNESS AND TINGLING IN BOTH HANDS: ICD-10-CM

## 2022-05-24 DIAGNOSIS — R20.2 NUMBNESS AND TINGLING IN BOTH HANDS: ICD-10-CM

## 2022-05-24 PROCEDURE — 95886 MUSC TEST DONE W/N TEST COMP: CPT | Performed by: PSYCHIATRY & NEUROLOGY

## 2022-05-24 PROCEDURE — 95911 NRV CNDJ TEST 9-10 STUDIES: CPT | Performed by: PSYCHIATRY & NEUROLOGY

## 2022-06-06 ENCOUNTER — TELEPHONE (OUTPATIENT)
Dept: OBGYN CLINIC | Facility: HOSPITAL | Age: 52
End: 2022-06-06

## 2022-06-06 NOTE — TELEPHONE ENCOUNTER
Patient called to reschedule her follow up due to vacation  Patient has been rescheduled in the first available in September    Patient would like to be added to a cancellation list, if possible

## 2024-12-11 ENCOUNTER — OFFICE VISIT (OUTPATIENT)
Dept: GASTROENTEROLOGY | Facility: CLINIC | Age: 54
End: 2024-12-11
Payer: COMMERCIAL

## 2024-12-11 VITALS
WEIGHT: 148 LBS | SYSTOLIC BLOOD PRESSURE: 142 MMHG | DIASTOLIC BLOOD PRESSURE: 86 MMHG | HEIGHT: 66 IN | BODY MASS INDEX: 23.78 KG/M2 | HEART RATE: 83 BPM

## 2024-12-11 DIAGNOSIS — Z12.11 SCREENING FOR COLON CANCER: ICD-10-CM

## 2024-12-11 DIAGNOSIS — R09.A2 GLOBUS SENSATION: ICD-10-CM

## 2024-12-11 DIAGNOSIS — K21.9 GASTROESOPHAGEAL REFLUX DISEASE WITHOUT ESOPHAGITIS: ICD-10-CM

## 2024-12-11 DIAGNOSIS — K22.2 ESOPHAGEAL STRICTURE: ICD-10-CM

## 2024-12-11 DIAGNOSIS — K22.70 BARRETT'S ESOPHAGUS WITHOUT DYSPLASIA: Primary | ICD-10-CM

## 2024-12-11 PROCEDURE — 99204 OFFICE O/P NEW MOD 45 MIN: CPT | Performed by: INTERNAL MEDICINE

## 2024-12-11 RX ORDER — LIFITEGRAST 50 MG/ML
SOLUTION/ DROPS OPHTHALMIC
COMMUNITY
Start: 2024-07-20

## 2024-12-11 RX ORDER — OMEPRAZOLE 40 MG/1
40 CAPSULE, DELAYED RELEASE ORAL DAILY
Qty: 30 CAPSULE | Refills: 5 | Status: SHIPPED | OUTPATIENT
Start: 2024-12-11

## 2024-12-11 NOTE — PROGRESS NOTES
Name: Marissa Ortega      : 1970      MRN: [id number]  Encounter Provider: Isidro Whalen MD  Encounter Date: 2024   Encounter department: 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  Records not available.  She believes she was told to return for follow-up EGD in 3 years.  Does not recall hearing the term dysplasia.  The extent of her Ellington's is not clear.  We discussed this diagnosis in detail including risk of esophageal cancer.  Recommend surveillance EGD based on histopathology.  Will obtain records from her prior gastroenterologist.  She was briefly on PPI in the form of pantoprazole but discontinued this after a month or so.  Did not tolerate esomeprazole due to diarrhea..  We discussed restarting PPI to reduce esophageal acid exposure and potentially lower the risk of malignant transformation  Orders:    omeprazole (PriLOSEC) 40 MG capsule; Take 1 capsule (40 mg total) by mouth daily    Gastroesophageal reflux disease without esophagitis  Does not report classic reflux symptoms of heartburn or regurgitation  Orders:    omeprazole (PriLOSEC) 40 MG capsule; Take 1 capsule (40 mg total) by mouth daily    Globus sensation  Has fairly constant sensation of a lump in her throat.  This did not seem to improve on a brief course of PPI.  Was evaluated by ENT in [place] for this and symptoms of dry mouth.  Since that time she has developed what she describes as a sensation of her uvula rubbing against her tongue and causing irritation.  Will refer her back to establish with an otolaryngologist here.  Orders:    Ambulatory Referral to Otolaryngology; Future    Esophageal stricture  Status post dilation.  Will reinitiate PPI.  Will contact this office if dysphagia returns for consideration of repeat EGD and dilation       Screening for colon  "cancer  Negative Cologuard in 2023.  Repeat after 3 years           History of Present Illness   HPI  Marissa Ortega is a 54 y.o. female who recently moved back to the area from [place].  She was evaluated earlier this year for symptoms of dysphagia and noted to have an esophageal stricture which was dilated on endoscopy.  Was also diagnosed with Ellington's esophagus at that time.  Her dysphagia has resolved following dilation but she has had persistent globus sensation.  Has never had a colonoscopy but did have a negative Cologuard in 2023.  Recent blood work from September 2024 reviewed including CBC, CRP, CMP, rheumatoid factor all unremarkable      Review of Systems       Objective   /86 (BP Location: Left arm, Patient Position: Sitting, Cuff Size: Standard)   Pulse 83   Ht 5' 6\" (1.676 m)   Wt 67.1 kg (148 lb)   LMP  (LMP Unknown)   BMI 23.89 kg/m²      Physical Exam      "

## 2024-12-11 NOTE — ASSESSMENT & PLAN NOTE
Does not report classic reflux symptoms of heartburn or regurgitation  Orders:    omeprazole (PriLOSEC) 40 MG capsule; Take 1 capsule (40 mg total) by mouth daily

## 2024-12-12 ENCOUNTER — TELEPHONE (OUTPATIENT)
Dept: GASTROENTEROLOGY | Facility: CLINIC | Age: 54
End: 2024-12-12

## 2025-06-20 ENCOUNTER — NURSE TRIAGE (OUTPATIENT)
Age: 55
End: 2025-06-20

## 2025-06-20 NOTE — TELEPHONE ENCOUNTER
Patients GI provider:  Dr. Whalen    Number to return call: [phone]    Reason for call: Pt calling to see if she can be seen sooner. The medication she is currently on is not working for her anymore.     Scheduled procedure/appointment date if applicable: follow up on 8/2/25

## 2025-06-20 NOTE — TELEPHONE ENCOUNTER
Consult 12/1/24. Ellington's GERD Globus sensation.Will reinitiate PPI. Will contact this office if dysphagia returns for consideration of repeat EGD and dilation     Previous GI note in media under 10/14/24; no EGD report. Feels symptoms are returning. Notes increase in phlegm, burping, throat clearing. No other GI complaints. Taking omeprazole 40 mg daily. Willing to take BID.     Pt has soonest appt.

## 2025-06-20 NOTE — TELEPHONE ENCOUNTER
REASON FOR CONVERSATION: GERD    SYMPTOMS:    Feels symptoms are returning. Notes increase in phlegm, burping, throat clearing.    OTHER HEALTH INFORMATION: No other GI complaints.Taking omeprazole 40 mg daily. Per 12/1/24 OV - Will contact this office if dysphagia returns for consideration of repeat EGD and dilation.     PROTOCOL DISPOSITION: Next Available Appointment and 72 Hour Provider Response    CARE ADVICE PROVIDED: Omeprazole and Pepcid BID    PRACTICE FOLLOW-UP: Pt has soonest appt. Please advise if you have additional recommendations.    Reason for Disposition   The patient is on PPI once a day with continued epigastric discomfort, reflux, regurgitation    Answer Assessment - Initial Assessment Questions  1. Do you have a history of GERD?  Yes  2. When did your symptoms start? Please describe your symptoms and how often you experience these symptoms.  Worsening   3. Are you taking any acid reducing medications currently such as: Prilosec (Omeprazole), Protonix (Pantoprazole), Prevacid (Lansoprazole), Nexium (Esomeprazole), Aciphex (Rabeprazole), Dexilant (Dexlansoprazole)?  Omeprazole  4. Have you tried any OTC acid reducing medications? (If so, which medications have you tried?) Zantac (Ranitidine), Pepcid (Famotidine), Tagamet (Cimetidine) Tums, Rolaids, Maalox, Mylanta.  No  6. Have you experienced any recent changes in your bowel habits?  Denies  9. Have you had any recent blood work, imaging, or procedures done? If yes, what were those?   No    Protocols used: GI-Gerd-ADULT-[place]

## 2025-08-04 ENCOUNTER — OFFICE VISIT (OUTPATIENT)
Age: 55
End: 2025-08-04
Payer: COMMERCIAL

## 2025-08-04 VITALS
WEIGHT: 161.2 LBS | HEIGHT: 66 IN | SYSTOLIC BLOOD PRESSURE: 126 MMHG | BODY MASS INDEX: 25.91 KG/M2 | DIASTOLIC BLOOD PRESSURE: 94 MMHG | HEART RATE: 79 BPM

## 2025-08-04 DIAGNOSIS — K22.70 BARRETT'S ESOPHAGUS WITHOUT DYSPLASIA: ICD-10-CM

## 2025-08-04 DIAGNOSIS — K21.9 GASTROESOPHAGEAL REFLUX DISEASE WITHOUT ESOPHAGITIS: Primary | ICD-10-CM

## 2025-08-04 DIAGNOSIS — R13.14 PHARYNGOESOPHAGEAL DYSPHAGIA: ICD-10-CM

## 2025-08-04 DIAGNOSIS — Z12.11 SCREENING FOR COLON CANCER: ICD-10-CM

## 2025-08-04 PROCEDURE — 99213 OFFICE O/P EST LOW 20 MIN: CPT | Performed by: INTERNAL MEDICINE

## 2025-08-04 RX ORDER — OMEPRAZOLE 40 MG/1
40 CAPSULE, DELAYED RELEASE ORAL DAILY
Qty: 90 CAPSULE | Refills: 1 | Status: SHIPPED | OUTPATIENT
Start: 2025-08-04